# Patient Record
Sex: FEMALE
[De-identification: names, ages, dates, MRNs, and addresses within clinical notes are randomized per-mention and may not be internally consistent; named-entity substitution may affect disease eponyms.]

---

## 2018-11-25 ENCOUNTER — HOSPITAL ENCOUNTER (EMERGENCY)
Dept: HOSPITAL 76 - ED | Age: 38
Discharge: HOME | End: 2018-11-25
Payer: COMMERCIAL

## 2018-11-25 VITALS — DIASTOLIC BLOOD PRESSURE: 108 MMHG | SYSTOLIC BLOOD PRESSURE: 142 MMHG

## 2018-11-25 DIAGNOSIS — R03.0: ICD-10-CM

## 2018-11-25 DIAGNOSIS — L02.215: Primary | ICD-10-CM

## 2018-11-25 PROCEDURE — 56405 I&D VULVA/PERINEAL ABSCESS: CPT

## 2018-11-25 PROCEDURE — 96372 THER/PROPH/DIAG INJ SC/IM: CPT

## 2018-11-25 PROCEDURE — 87070 CULTURE OTHR SPECIMN AEROBIC: CPT

## 2018-11-25 PROCEDURE — 99283 EMERGENCY DEPT VISIT LOW MDM: CPT

## 2018-11-25 PROCEDURE — 87205 SMEAR GRAM STAIN: CPT

## 2018-11-25 NOTE — ED PHYSICIAN DOCUMENTATION
History of Present Illness





- Stated complaint


Stated Complaint: FEMALE 





- Chief complaint


Chief Complaint: General





- History obtained from


History obtained from: Patient





- History of Present Illness


Timing: Other (She has an abscess in the perineum that has become increasingly 

painful over the last couple of days without fevers or chills.  She had one in 

the same spot last year that her  manipulated and popped at home.  She 

denies possibility of pregnancy.)





Review of Systems


Constitutional: denies: Fever, Chills


Cardiac: denies: Chest pain / pressure, Palpitations


Respiratory: denies: Dyspnea, Cough


GI: denies: Abdominal Pain





PD PAST MEDICAL HISTORY





- Present Medications


Home Medications: 


                                Ambulatory Orders











 Medication  Instructions  Recorded  Confirmed


 


Amox/Clav 875/125 [Augmentin] 1 each PO Q12H #14 tablet 11/25/18 


 


Hydrocodone/Acetaminophen 1 - 2 each PO Q6H PRN #14 tablet 11/25/18 





[Hydrocodon-Acetaminophen 5-325]   














- Allergies


Allergies/Adverse Reactions: 


                                    Allergies











Allergy/AdvReac Type Severity Reaction Status Date / Time


 


codeine Allergy  Hives Verified 11/25/18 19:37














PD ED PE NORMAL





- Vitals


Vital signs reviewed: Yes





- General


General: Alert and oriented X 3, Other (She is standing at the side of the bed 

leaning over because she cannot sit on her perineum is otherwise in no 

distress.)





- Abdomen


Abdomen: Soft, Non tender





- Female 


Female : Chaperone present (Nellie DUBON RN), Other (L inner gluteal abscess, 1cm, 

pointed)





- Neuro


Neuro: Alert and oriented X 3, Normal speech





- Psych


Psych: Normal mood, Normal affect





Results





- Vitals


Vitals: 


                               Vital Signs - 24 hr











  11/25/18





  19:34


 


Temperature 36.0 C L


 


Heart Rate 121 H


 


Respiratory 20





Rate 


 


Blood Pressure 144/104 H


 


O2 Saturation 98








                                     Oxygen











O2 Source                      Room air

















Procedures





- Abscess I&D (location)


  ** Buttock


Preparation: Betadine, Lidocaine 1%


Incision: Incised with scalpel, Purulent drainage, Loculations broken, Culture 

obtained.  No: Packed (too small)


Other: Pt tolerated well, Dressing applied, Antibiotic prescribed





Departure





- Departure


Disposition: 01 Home, Self Care


Clinical Impression: 


 Abscess





Condition: Good


Record reviewed to determine appropriate education?: Yes


Instructions:  ED Abscess IandD


Prescriptions: 


Amox/Clav 875/125 [Augmentin] 1 each PO Q12H #14 tablet


Hydrocodone/Acetaminophen [Hydrocodon-Acetaminophen 5-325] 1 - 2 each PO Q6H PRN

#14 tablet


 PRN Reason: pain


Comments: 


We are performing a wound culture, the results should be done in 48-72 hours.  

If antibiotic change is necessary we will call you.  Return if worse in the 

meantime, especially if you develop increased pain, fevers, cannot keep down the

medication.  Otherwise follow-up with your physician in approximately 2-3 days.





Your blood pressure was elevated today on check into the emergency department.  

This does not mean that you have hypertension, it is a common phenomenon to come

to the emergency department and have elevated blood pressure.  I recommend that 

you see your primary care physician within the week to have it rechecked when 

you are feeling better.





Do not drink or drive while taking narcotic pain medication.


Note that many narcotic pain relievers also contain Tylenol/acetaminophen.  

Please ensure that your total dose of acetaminophen from all sources does not 

exceed 3 g (3000 mg) per day.


You may get constipated while on this medication.  Take a stool softener such as

Colace twice a day while you are on it.  Also add an over-the-counter laxative 

such as senna or MiraLAX on any day that you do not have a bowel movement.


If you received a narcotic pain medication or sedative while in the emergency 

department, do not drive for the next 24 hours.

## 2019-09-03 ENCOUNTER — HOSPITAL ENCOUNTER (OUTPATIENT)
Dept: HOSPITAL 76 - LAB | Age: 39
Discharge: HOME | End: 2019-09-03
Attending: OBSTETRICS & GYNECOLOGY
Payer: COMMERCIAL

## 2019-09-03 DIAGNOSIS — O09.529: Primary | ICD-10-CM

## 2019-09-03 PROCEDURE — 81511 FTL CGEN ABNOR FOUR ANAL: CPT

## 2019-09-03 PROCEDURE — 82950 GLUCOSE TEST: CPT

## 2019-09-03 PROCEDURE — 81599 UNLISTED MAAA: CPT

## 2019-09-03 PROCEDURE — 36415 COLL VENOUS BLD VENIPUNCTURE: CPT

## 2019-10-22 ENCOUNTER — HOSPITAL ENCOUNTER (OUTPATIENT)
Dept: HOSPITAL 76 - DI | Age: 39
Discharge: HOME | End: 2019-10-22
Attending: OBSTETRICS & GYNECOLOGY
Payer: COMMERCIAL

## 2019-10-22 DIAGNOSIS — Z34.92: Primary | ICD-10-CM

## 2019-10-22 PROCEDURE — 76811 OB US DETAILED SNGL FETUS: CPT

## 2019-10-22 NOTE — ULTRASOUND REPORT
Reason:  SUPERVISION OF NORMAL PREGNANCY

Procedure Date:  10/22/2019   

Accession Number:  [id number] / [id number]                    

Procedure:  US  - OB Detailed Fetal Eval CPT Code:  

 

FULL RESULT:

 

 

EXAM:

COMPLETE OBSTETRICAL ULTRASOUND

 

EXAM DATE: 10/22/2019 11:56 AM.

 

CLINICAL HISTORY: Fetal anatomic survey.

 

COMPARISON: None.

 

TECHNIQUE: Real-time sonographic evaluation of the fetus performed by the 

sonographer. Multiple representative static images were saved for review. 

Additional transvaginal imaging to more accurately evaluate cervical 

length/placental position/etc.

 

DATING:

Established EGA 23 weeks 1 day with YIN 02/17/2020 based on provider 

information.

 

EGA 23 weeks 4 days with YIN 02/14/2020 based on the current ultrasound.

 

GENERAL EVALUATION

Morse pregnancy.

Cardiac activity: 141 bpm.

Fetal movement: Present

Presentation: Variable

Placenta: Posterior position. No evidence for previa.

Umbilical cord: 3 vessel cord. Central placental cord origin.

Amniotic fluid: Subjectively normal. MVP 6.2 cm.

 

FETAL BIOMETRY

Bi-Parietal Diameter (BPD): 5.6 cm, 23 weeks 0 days

Head Circumference (HC):   21.4 cm, 23 weeks 3 days

Abdominal Circumference (AC): 19.9 cm, 24 weeks 4 days

Femur Length (FL): 4.1 cm, 23 weeks 1 day

 

Estimated Fetal Weight: 635 g, 76th percentile for 23 weeks 1 day.

 

FETAL ANATOMY

The intracranial structures, profile, face/nose/lips, spine, stomach,  

diaphragm, kidneys, bladder, and extremities were imaged and demonstrate 

no abnormality. The anterior fetal abdominal wall with cord insertion and 

fetal cardiac anatomy are not well seen.

 

MATERNAL STRUCTURES

Uterus: Unremarkable.

Cervix: Long and closed. Transabdominal length 6 cm.

Right ovary/adnexa: Unremarkable.

Left ovary/adnexa: Unremarkable.

Free fluid: None.

IMPRESSION:

1. Morse live intrauterine pregnancy with gestational age 23 weeks 1 

day based on provider information.

2. Estimated fetal weight is within expected limits for assigned dating.

3. Fetal cardiac anatomy and fetal anterior abdominal wall with cord 

insertion not well seen today. Suggest follow-up imaging in 2-3 weeks. 

Otherwise Normal fetal anatomic survey.

 

RADIA

## 2019-11-05 ENCOUNTER — HOSPITAL ENCOUNTER (OUTPATIENT)
Dept: HOSPITAL 76 - DI | Age: 39
Discharge: HOME | End: 2019-11-05
Attending: OBSTETRICS & GYNECOLOGY
Payer: COMMERCIAL

## 2019-11-05 DIAGNOSIS — Z34.90: Primary | ICD-10-CM

## 2019-11-05 PROCEDURE — 76816 OB US FOLLOW-UP PER FETUS: CPT

## 2019-12-06 ENCOUNTER — HOSPITAL ENCOUNTER (OUTPATIENT)
Dept: HOSPITAL 76 - DI | Age: 39
Discharge: HOME | End: 2019-12-06
Attending: OBSTETRICS & GYNECOLOGY
Payer: COMMERCIAL

## 2019-12-06 DIAGNOSIS — Z3A.29: ICD-10-CM

## 2019-12-06 DIAGNOSIS — O09.893: ICD-10-CM

## 2019-12-06 DIAGNOSIS — O24.419: Primary | ICD-10-CM

## 2019-12-06 DIAGNOSIS — O40.3XX0: ICD-10-CM

## 2019-12-06 PROCEDURE — 76816 OB US FOLLOW-UP PER FETUS: CPT

## 2019-12-09 NOTE — ULTRASOUND REPORT
Reason:  GESTATIONAL DIABETES, POLYHYDRAMNIOS

Procedure Date:  12/06/2019   

Accession Number:  [id number] / [id number]                    

Procedure:  US  - OB F/U or Repeat CPT Code:  

 

***Final Report***

 

 

FULL RESULT:

 

 

EXAM:

FOLLOW-UP OBSTETRICAL ULTRASOUND

 

EXAM DATE: 12/6/2019 04:24 PM.

 

CLINICAL HISTORY: Gestational diabetes, polyhydramnios.

 

COMPARISON: OB F/U OR REPEAT 11/05/2019 12:25 PM.

OB DETAILED FETAL EVAL 10/22/2019 10:09 AM.

 

TECHNIQUE: Real-time sonographic evaluation of the fetus performed by the 

sonographer.  Multiple representative static images were saved for review.

 

DATING:

Established EGA 29 weeks 4 days with YIN 02/17/2020 based on working due 

date and LMP.

 

EGA 31 weeks 0 days with YIN 02/07/2020 based on the current ultrasound.

 

GENERAL EVALUATION

Morse pregnancy.

Cardiac activity: 129 bpm.

Fetal movement: Visualized.

Presentation: Cephalic.

Placenta: Posterior position.

Amniotic fluid: Normal. JOHNNY 14.2 cm. MVP 4.1 cm.

 

FETAL BIOMETRY

Bi-Parietal Diameter (BPD): 7.7 cm, 30 weeks 5 days.

Head Circumference (HC): 28.6 cm, 31 weeks 3 days.

Abdominal Circumference (AC): 26.6 cm, 30 weeks 5 days.

Femur Length (FL): 5.9 cm, 31 weeks 0 days.

 

Estimated Fetal Weight: 1659 g, 83rd percentile for 29 weeks 4 days.

IMPRESSION:

1. Morse live intrauterine pregnancy with gestational age 29 weeks 4 

days based on LMP.

2. Estimated fetal weight is within expected limits for assigned dating.

3. Normal interval growth compared to date of prior biometry.

4. Amniotic fluid within normal limits.

 

RADIA

## 2020-01-07 ENCOUNTER — HOSPITAL ENCOUNTER (OUTPATIENT)
Dept: HOSPITAL 76 - DI | Age: 40
Discharge: HOME | End: 2020-01-07
Attending: OBSTETRICS & GYNECOLOGY
Payer: COMMERCIAL

## 2020-01-07 DIAGNOSIS — Z3A.36: ICD-10-CM

## 2020-01-07 DIAGNOSIS — O09.893: Primary | ICD-10-CM

## 2020-01-07 DIAGNOSIS — O24.419: ICD-10-CM

## 2020-01-07 PROCEDURE — 76816 OB US FOLLOW-UP PER FETUS: CPT

## 2020-01-08 NOTE — ULTRASOUND REPORT
Reason:  GESTATIONAL DIABETES, SUPERVOF HIGH RISK PREG

Procedure Date:  01/07/2020   

Accession Number:  [id number] / [id number]                    

Procedure:  US  - OB F/U or Repeat CPT Code:  

 

***Final Report***

 

 

FULL RESULT:

 

 

EXAM:

FOLLOW-UP OBSTETRICAL ULTRASOUND.

 

EXAM DATE: 01/07/2020 02:22 PM.

 

CLINICAL HISTORY: Gestational diabetes. High-risk pregnancy. Assess fetal 

growth and amniotic fluid.

 

COMPARISON: 12/06/2019.

 

TECHNIQUE: Real-time sonographic evaluation of the fetus performed by the 

sonographer. Additional transvaginal imaging to more accurately evaluate 

cervical length/placental position/etc. Multiple representative static 

images were saved for review.

 

DATING:

Established EGA 34 weeks/1 day with YIN 02/17/2020 based on provider 

stated.

EGA 36 weeks/5 days with YIN 01/30/2020 based on current ultrasound.

EGA 36 weeks/5 days with YIN 01/30/2020 based on the initial ultrasound 

10/22/2019.

 

GENERAL EVALUATION

Morse pregnancy.

Cardiac activity: 139 bpm.

Fetal movement: Visualized.

Presentation: Cephalic.

Placenta: Posterior position.

Amniotic fluid: Normal. JOHNNY 19.5 cm. MVP 6.9 cm.

 

FETAL BIOMETRY

Bi-Parietal Diameter (BPD): 9.1 cm, 36 weeks/6 days

Head Circumference (HC): 33.49 cm, 38 weeks/2 days

Abdominal Circumference (AC): 33.0 cm, 37 weeks/0 days

Femur Length (FL): 6.70 cm, 34 weeks/3 days

 

Estimated Fetal Weight: 2945 g, 96 percentile for assigned dating.

IMPRESSION:

1. Morse live intrauterine pregnancy with gestational age 36 weeks 5 

days based on fetal biometry performed today. This is within 2 weeks of 

the gestational age based on the initial ultrasound (10/22/2019).

2. Estimated fetal weight (2945 g) is within the 96th percentile for 

assigned dating.

3. Normal amniotic fluid (19.5 cm - JOHNNY).

 

JONASA

## 2020-01-21 ENCOUNTER — HOSPITAL ENCOUNTER (OUTPATIENT)
Dept: HOSPITAL 76 - LAB.R | Age: 40
Discharge: HOME | End: 2020-01-21
Attending: OBSTETRICS & GYNECOLOGY
Payer: COMMERCIAL

## 2020-01-21 DIAGNOSIS — Z36.85: Primary | ICD-10-CM

## 2020-01-21 LAB — T VAGINALIS RRNA GENITAL QL PROBE: NEGATIVE

## 2020-01-21 PROCEDURE — 87661 TRICHOMONAS VAGINALIS AMPLIF: CPT

## 2020-01-21 PROCEDURE — 87491 CHLMYD TRACH DNA AMP PROBE: CPT

## 2020-01-21 PROCEDURE — 87591 N.GONORRHOEAE DNA AMP PROB: CPT

## 2020-01-21 PROCEDURE — 87797 DETECT AGENT NOS DNA DIR: CPT

## 2020-02-07 ENCOUNTER — HOSPITAL ENCOUNTER (OUTPATIENT)
Dept: HOSPITAL 76 - DI | Age: 40
Discharge: HOME | End: 2020-02-07
Attending: OBSTETRICS & GYNECOLOGY
Payer: COMMERCIAL

## 2020-02-07 DIAGNOSIS — O09.892: ICD-10-CM

## 2020-02-07 DIAGNOSIS — Z3A.38: ICD-10-CM

## 2020-02-07 DIAGNOSIS — O40.3XX0: ICD-10-CM

## 2020-02-07 DIAGNOSIS — O24.419: Primary | ICD-10-CM

## 2020-02-07 PROCEDURE — 76816 OB US FOLLOW-UP PER FETUS: CPT

## 2020-02-08 NOTE — ULTRASOUND REPORT
Reason:  GESTATION DIABETES, POLYHYDRAMNIOS, SUPERV 2ND TRI

Procedure Date:  02/07/2020   

Accession Number:  [id number] / [id number]                    

Procedure:  US  - OB F/U or Repeat CPT Code:  

 

***Final Report***

 

 

FULL RESULT:

 

 

EXAM:

FOLLOW-UP OBSTETRICAL ULTRASOUND

 

EXAM DATE: 2/7/2020 09:24 AM.

 

CLINICAL HISTORY: Gestational diabetes. Polyhydramnios.

 

COMPARISON: 01/07/2020 radiograph.

 

TECHNIQUE: Real-time sonographic evaluation of the fetus performed by the 

sonographer. Additional transvaginal imaging to more accurately evaluate 

cervical length/placental position/etc. Multiple representative static 

images were saved for review.

 

DATING:

Established EGA 38 weeks 4 days with YIN 2/17/20 based on established 

gestational age.

EGA 39 weeks 0 days with YIN 2/14/20 based on first ultrasound.

 

 

GENERAL EVALUATION

Morse pregnancy.

Cardiac activity: 142 bpm.

Fetal movement: Visualized.

Presentation: Cephalic.

Placenta: Posterior/fundal position.

Amniotic fluid: Normal. JOHNNY 16.2 cm. MVP 6.1 cm.

 

FETAL BIOMETRY

Bi-Parietal Diameter (BPD): 9.89 cm, 40 weeks 4 days +/- 3 weeks

Head Circumference (HC): 35.2 cm, 40 weeks 0 days +/- 3 weeks

Abdominal Circumference (AC): 36.7 cm, 40 weeks 4 days +/-3 weeks

Femur Length (FL): 7.6 cm, 38 weeks 5 days +/-3 weeks

 

Estimated Fetal Weight: 4047 g, 94.9 percentile for 38 weeks 4 days.

IMPRESSION:

1. Morse live intrauterine pregnancy with gestational age 38 weeks 4 

days based on established gestational age.

2. Fetal weight remains near the 95th percentile.

3. Normal interval growth compared to date of prior biometry.

 

RADIA

## 2020-02-10 ENCOUNTER — HOSPITAL ENCOUNTER (OUTPATIENT)
Dept: HOSPITAL 76 - LAB | Age: 40
Discharge: HOME | End: 2020-02-10
Attending: OBSTETRICS & GYNECOLOGY
Payer: COMMERCIAL

## 2020-02-10 DIAGNOSIS — O34.211: ICD-10-CM

## 2020-02-10 DIAGNOSIS — Z3A.00: ICD-10-CM

## 2020-02-10 DIAGNOSIS — Z01.812: Primary | ICD-10-CM

## 2020-02-10 PROCEDURE — 86850 RBC ANTIBODY SCREEN: CPT

## 2020-02-10 PROCEDURE — 86900 BLOOD TYPING SEROLOGIC ABO: CPT

## 2020-02-10 PROCEDURE — 36415 COLL VENOUS BLD VENIPUNCTURE: CPT

## 2020-02-10 PROCEDURE — 86901 BLOOD TYPING SEROLOGIC RH(D): CPT

## 2020-02-12 ENCOUNTER — HOSPITAL ENCOUNTER (INPATIENT)
Dept: HOSPITAL 76 - FBP | Age: 40
LOS: 2 days | Discharge: HOME | End: 2020-02-14
Attending: OBSTETRICS & GYNECOLOGY | Admitting: OBSTETRICS & GYNECOLOGY
Payer: COMMERCIAL

## 2020-02-12 DIAGNOSIS — O34.211: Primary | ICD-10-CM

## 2020-02-12 DIAGNOSIS — N85.8: ICD-10-CM

## 2020-02-12 DIAGNOSIS — Z79.899: ICD-10-CM

## 2020-02-12 DIAGNOSIS — Z87.891: ICD-10-CM

## 2020-02-12 DIAGNOSIS — Z3A.39: ICD-10-CM

## 2020-02-12 LAB
BASOPHILS NFR BLD AUTO: 0 10^3/UL (ref 0–0.1)
BASOPHILS NFR BLD AUTO: 0.3 %
EOSINOPHIL # BLD AUTO: 0 10^3/UL (ref 0–0.7)
EOSINOPHIL NFR BLD AUTO: 0.3 %
ERYTHROCYTE [DISTWIDTH] IN BLOOD BY AUTOMATED COUNT: 14.7 % (ref 12–15)
HGB UR QL STRIP: 12 G/DL (ref 12–16)
LYMPHOCYTES # SPEC AUTO: 2.8 10^3/UL (ref 1.5–3.5)
LYMPHOCYTES NFR BLD AUTO: 27.4 %
MCH RBC QN AUTO: 29.8 PG (ref 27–31)
MCHC RBC AUTO-ENTMCNC: 33.5 G/DL (ref 32–36)
MCV RBC AUTO: 88.8 FL (ref 81–99)
MONOCYTES # BLD AUTO: 0.7 10^3/UL (ref 0–1)
MONOCYTES NFR BLD AUTO: 6.9 %
NEUTROPHILS # BLD AUTO: 6.7 10^3/UL (ref 1.5–6.6)
NEUTROPHILS # SNV AUTO: 10.3 X10^3/UL (ref 4.8–10.8)
NEUTROPHILS NFR BLD AUTO: 64.7 %
PDW BLD AUTO: 10.6 FL (ref 7.9–10.8)
PLATELET # BLD: 190 10^3/UL (ref 130–450)
RBC MAR: 4.03 10^6/UL (ref 4.2–5.4)

## 2020-02-12 PROCEDURE — 36415 COLL VENOUS BLD VENIPUNCTURE: CPT

## 2020-02-12 PROCEDURE — 85025 COMPLETE CBC W/AUTO DIFF WBC: CPT

## 2020-02-12 RX ADMIN — OXYCODONE PRN MG: 5 TABLET ORAL at 15:13

## 2020-02-12 RX ADMIN — OXYCODONE PRN MG: 5 TABLET ORAL at 20:03

## 2020-02-12 RX ADMIN — SIMETHICONE PRN MG: 80 TABLET, CHEWABLE ORAL at 17:57

## 2020-02-12 RX ADMIN — SODIUM CHLORIDE, PRESERVATIVE FREE SCH ML: 5 INJECTION INTRAVENOUS at 17:17

## 2020-02-12 RX ADMIN — ACETAMINOPHEN SCH MG: 500 TABLET ORAL at 13:03

## 2020-02-12 RX ADMIN — ACETAMINOPHEN SCH MG: 500 TABLET ORAL at 21:02

## 2020-02-12 RX ADMIN — DOCUSATE SODIUM SCH MG: 100 CAPSULE, LIQUID FILLED ORAL at 21:02

## 2020-02-12 RX ADMIN — HEPARIN SODIUM SCH UNIT: 5000 INJECTION, SOLUTION INTRAVENOUS; SUBCUTANEOUS at 21:03

## 2020-02-12 RX ADMIN — IBUPROFEN SCH MG: 600 TABLET, FILM COATED ORAL at 16:35

## 2020-02-12 RX ADMIN — IBUPROFEN SCH MG: 600 TABLET, FILM COATED ORAL at 22:38

## 2020-02-12 NOTE — ANESTHESIA
Pre-Anesthesia VS, & Labs





- Diagnosis





IUP





- Procedure





Repeat C Sec


Vital Signs: 





                                        











Temp Pulse Resp BP Pulse Ox


 


 36.8 C   97   18   110/64   97 


 


 20 05:30  20 05:30  20 05:30  20 05:30  20 05:30














                                        





Height                           5 ft 4 in


Weight (kg)                      128.73 kg


Body Mass Index                  49.1











- NPO


>8 hours





- Pregnancy


Is Patient Pregnant?: Yes





- Lab Results


Current Lab Results: 





Laboratory Tests





20 06:22: POC Whole Bld Glucose 86


20 06:20: WBC 10.3, RBC 4.03 L, Hgb 12.0, Hct 35.8 L, MCV 88.8, MCH 29.8, 

MCHC 33.5, RDW 14.7, Plt Count 190, MPV 10.6, Neut # (Auto) 6.7 H, Lymph # 

(Auto) 2.8, Mono # (Auto) 0.7, Eos # (Auto) 0.0, Baso # (Auto) 0.0, Absolute 

Nucleated RBC 0.00, Nucleated RBC % 0.0








Fish Bones: 


                                 20 06:20








Home Medications and Allergies





Active Medications





Lactated Ringer's (Lr)  1,000 mls @ 0 mls/hr IV .Q0M ROMINA


   Last Admin: 20 06:31 Dose:  30 mls/hr








Allergies/Adverse Reactions: 


                                    Allergies











Allergy/AdvReac Type Severity Reaction Status Date / Time


 


codeine Allergy  Hives Verified 18 19:37














Anes History & Medical History





- Anesthetic History


Anesthesia Complications: reports: No previous complications


Family history of Anesthesia Complications: Denies


Family history of Malignant Hyperthermia: Denies





- Medical History


Cardiovascular: reports: None


Pulmonary: reports: None


Gastrointestinal: reports: GERD (reports really bad heart burn. We will give 

bicitra)


Urinary: reports: None


Neuro: reports: None


Musculoskeletal: reports: None


Endocrine/Autoimmune: reports: None


Blood Disorders: reports: None


Skin: reports: None


Smoking Status: Former smoker


Psychosocial: reports: No issues indicated





- Surgical History


Eyes Ears Nose Throat (EENT): Tonsil/Adenoidectomy


Gynecologic:  section





Exam


General: Alert, Oriented x3, Cooperative, No acute distress


Dental: WNL


Mouth Opening: 3 Fingerbreadth


Neck Mobility: Normal


Mallampati classification: II


Thyromental Distance: less than 4 cm


Respiratory: Lungs clear, Normal breath sounds, No respiratory distress, No 

accessory muscle use


Cardiovascular: Regular rate, Normal S1, Normal S2, No murmurs


Abdomen: Normal bowel sounds, Soft, No tenderness, No hepatospenomegaly, No 

masses


Extremities: No clubbing, No cyanosis, No edema, Normal pulses, No 

tenderness/swelling


Neurological: Normal gait, Normal speech, Strength at 5/5 X4 ext, Normal tone, 

Sensation intact, Cranial nerves 3-12 NL, Reflexes 2+


Mental/Cognitive Status: Alert/Oriented X3, Normal for patient


Cognitive Status: Within normal limits





Plan


Anesthesia Type: General, Spinal


Consent for Procedure(s) Verified and Reviewed: Yes


Code Status: Attempt Resuscitation


ASA classification: 2-Mild systemic disease


Is this case an emergency?: No

## 2020-02-12 NOTE — OPERATIVE REPORT
Operative Report





- General


Admit Date: 20


Procedure Date: 20


Pre-Op Diagnosis: Prior , IUP at 39w


Procedure Performed: 





Repeat 





- Procedure Note


Primary Surgeon: patricia


Secondary Surgeon: Sharyn = ARELI Sen


Anesthesia Technique: Epidural


Pathology: 





cord blood for typing


IV Fluids (mL): 800


Estimated Blood Loss (mL): 400


Urine Output (mL): 200


Findings: 





Liveborn male, /


Clear amniotic fluid


Ov/tubes normal to palpation.  No adhesive disease.  


Complications: 





none

## 2020-02-12 NOTE — OPERATIVE REPORT
DATE OF SERVICE: 2020

Physician: Danna Bennett MD

 

PREOPERATIVE DIAGNOSES

1.  Intrauterine pregnancy at 39 weeks.

2.  Prior  section.

3.  Gestational diabetes.

 

POSTOPERATIVE DIAGNOSIS:  Status post repeat  section.

 

 

PROCEDURE PERFORMED:  Repeat low transverse  section.

 

SURGEON:  Danna Bennett MD

 

ASSISTANT:  Amber Sen CNM.

 

ANESTHESIA:  Spinal.

 

ESTIMATED BLOOD LOSS:  400 mL

 

INTRAVENOUS FLUIDS:  800 mL of crystalloid.

 

URINE OUTPUT:  200 mL, clear.

 

COUNTS:  Correct x2.

 

COMPLICATIONS:  None apparent.

 

DISPOSITION:  Stable to recovery room.

 

PROPHYLAXIS:  Ancef 3 grams weight based.  SCDs to bilateral lower extremities.

 

SPECIMENS:  Cord blood to pathology.

 

FINDINGS:  Liveborn male, weight is pending.  Apgars 9 at one minute and 9 at five minutes.  Amniotic
 fluid was clear.  The ovaries and fallopian tubes were normal to palpation.  No adhesive disease was
 encountered.  

 

COUNSELING:  Patient declined her option of trial of labor.

 

DESCRIPTION OF PROCEDURE:  Patient was brought to the operating room, where she underwent spinal anes
thesia.  She was placed in a left tilt.  A Umana catheter was placed.  She was prepped and draped in 
the usual sterile fashion.  A scalpel was used to make a Pfannenstiel skin incision in the area of th
e patient's prior scar.  This was carried down to the fascia, which was nicked in the midline bilater
ally.  The fascial incision was extended laterally, sharply.  The Kocher clamps were then placed on t
he inferior margin of the fascial incision and the fascia was sharply and bluntly dissected off of th
e underlying rectus.  The same was performed superiorly.  The peritoneum was sharply entered.  Room w
as deemed to be adequate.  A bladder retractor was placed.  A scalpel was used to make a transverse i
ncision in the lower uterine segment.  The uterine cavity was breeched with a finger.  The uterine in
cision was extended laterally by applying pressure caudally and cranially.  The surgeon's hand was pl
aced into the uterine cavity and the fetal head was elevated and then delivered with the assistance o
f fundal pressure.  The shoulders and body easily followed.  The cord was left pulsating for 30 secon
ds.  During this time, the baby was warmed, dried and stimulated.  The umbilical cord was clamped x2 
and cut and the baby was handed to the team in waiting.  

 

Cord blood was obtained for typing.  The placenta was then removed with external uterine massage and 
appeared intact.  The uterus was curetted with a dry sponge without any return of membranes.  The [place]
rine incision was closed with a running layer of 0 Vicryl.  A second imbricating layer was performed.
  There was a bit of oozing in the midline, which was reapproximated with a single interrupted suture
 of 0 Vicryl with good hemostasis.  Excellent hemostasis was noted on the uterine incision, rectus an
d fascia.  The fascia was closed with a running layer of 0 Vicryl from end to end.  The subcutaneous 
tissues were irrigated and then reapproximated with interrupted sutures of 2-0 Vicryl.  The skin was 
closed with a subcuticular suture of 4-0 Monocryl.  Dermabond was then applied.  Fundal massage yield
ed a normal amount of blood and clot.

 

 

DD: 2020 12:38

TD: 2020 12:41

Job #: [id number]

## 2020-02-13 LAB
BASOPHILS NFR BLD AUTO: 0 10^3/UL (ref 0–0.1)
BASOPHILS NFR BLD AUTO: 0.2 %
EOSINOPHIL # BLD AUTO: 0 10^3/UL (ref 0–0.7)
EOSINOPHIL NFR BLD AUTO: 0.1 %
ERYTHROCYTE [DISTWIDTH] IN BLOOD BY AUTOMATED COUNT: 14.7 % (ref 12–15)
HGB UR QL STRIP: 10.3 G/DL (ref 12–16)
LYMPHOCYTES # SPEC AUTO: 2.6 10^3/UL (ref 1.5–3.5)
LYMPHOCYTES NFR BLD AUTO: 22.3 %
MCH RBC QN AUTO: 29.6 PG (ref 27–31)
MCHC RBC AUTO-ENTMCNC: 32.4 G/DL (ref 32–36)
MCV RBC AUTO: 91.4 FL (ref 81–99)
MONOCYTES # BLD AUTO: 0.8 10^3/UL (ref 0–1)
MONOCYTES NFR BLD AUTO: 7.1 %
NEUTROPHILS # BLD AUTO: 8.2 10^3/UL (ref 1.5–6.6)
NEUTROPHILS # SNV AUTO: 11.8 X10^3/UL (ref 4.8–10.8)
NEUTROPHILS NFR BLD AUTO: 69.7 %
PDW BLD AUTO: 10.3 FL (ref 7.9–10.8)
PLATELET # BLD: 171 10^3/UL (ref 130–450)
RBC MAR: 3.48 10^6/UL (ref 4.2–5.4)

## 2020-02-13 RX ADMIN — OXYCODONE PRN MG: 5 TABLET ORAL at 03:03

## 2020-02-13 RX ADMIN — ACETAMINOPHEN SCH MG: 500 TABLET ORAL at 05:07

## 2020-02-13 RX ADMIN — DOCUSATE SODIUM SCH MG: 100 CAPSULE, LIQUID FILLED ORAL at 21:19

## 2020-02-13 RX ADMIN — SODIUM CHLORIDE, PRESERVATIVE FREE SCH: 5 INJECTION INTRAVENOUS at 07:40

## 2020-02-13 RX ADMIN — SIMETHICONE PRN MG: 80 TABLET, CHEWABLE ORAL at 09:57

## 2020-02-13 RX ADMIN — SIMETHICONE PRN MG: 80 TABLET, CHEWABLE ORAL at 18:03

## 2020-02-13 RX ADMIN — SODIUM CHLORIDE, PRESERVATIVE FREE SCH: 5 INJECTION INTRAVENOUS at 14:21

## 2020-02-13 RX ADMIN — OXYCODONE PRN MG: 5 TABLET ORAL at 08:00

## 2020-02-13 RX ADMIN — IBUPROFEN SCH MG: 600 TABLET, FILM COATED ORAL at 10:46

## 2020-02-13 RX ADMIN — ACETAMINOPHEN SCH MG: 500 TABLET ORAL at 21:19

## 2020-02-13 RX ADMIN — OXYCODONE PRN MG: 5 TABLET ORAL at 21:20

## 2020-02-13 RX ADMIN — DOCUSATE SODIUM SCH MG: 100 CAPSULE, LIQUID FILLED ORAL at 09:57

## 2020-02-13 RX ADMIN — OXYCODONE PRN MG: 5 TABLET ORAL at 11:52

## 2020-02-13 RX ADMIN — ACETAMINOPHEN SCH MG: 500 TABLET ORAL at 12:58

## 2020-02-13 RX ADMIN — HEPARIN SODIUM SCH UNIT: 5000 INJECTION, SOLUTION INTRAVENOUS; SUBCUTANEOUS at 21:20

## 2020-02-13 RX ADMIN — OXYCODONE PRN MG: 5 TABLET ORAL at 16:29

## 2020-02-13 RX ADMIN — IBUPROFEN SCH MG: 600 TABLET, FILM COATED ORAL at 04:26

## 2020-02-13 RX ADMIN — SIMETHICONE PRN MG: 80 TABLET, CHEWABLE ORAL at 12:59

## 2020-02-13 RX ADMIN — IBUPROFEN SCH MG: 600 TABLET, FILM COATED ORAL at 16:31

## 2020-02-13 RX ADMIN — IBUPROFEN SCH MG: 600 TABLET, FILM COATED ORAL at 22:39

## 2020-02-13 RX ADMIN — HEPARIN SODIUM SCH UNIT: 5000 INJECTION, SOLUTION INTRAVENOUS; SUBCUTANEOUS at 09:57

## 2020-02-13 NOTE — PROVIDER PROGRESS NOTE
Subjective





- Subjective


Subjective: 





Pt seen at 0845


Feeling well, pain is normal, no problems.  Eat, ambulate, urinate, breastfeed 

well.  Having flatus.  No heavy bleeding.  No mood issues





AVSS


Alert, smiling, NAD


Abd soft, nt/nd.  Fundus firm at umbilicus.  Incision c/d/i without erythema or 

induration


LE without edema





P2 POD #1 s/p scheduled repeat , doing well.  Plan for routine PP care.

 Obese, heparin 5,000 sq bid.  





Objective





- Vital Signs/Intake & Output


Vital Signs: 


                                Vital Signs x48h











  Temp Pulse Resp BP Pulse Ox


 


 20 16:34  97.9 F  86  18  115/55 L  98











Intake & Output: 


                                 Intake & Output











 02/10/20 02/11/20 02/12/20 02/13/20





 23:59 23:59 23:59 23:59


 


Intake Total   1600 500


 


Output Total   1110 2875


 


Balance   490 -2375














- Lab Results


Fish Bones: 


                                 20 07:00





Other Labs: 


                               Lab Results x24hrs











  20 Range/Units





  07:00 


 


WBC  11.8 H  (4.8-10.8)  x10^3/uL


 


RBC  3.48 L  (4.20-5.40)  10^6/uL


 


Hgb  10.3 L  (12.0-16.0)  g/dL


 


Hct  31.8 L  (37.0-47.0)  %


 


MCV  91.4  (81.0-99.0)  fL


 


MCH  29.6  (27.0-31.0)  pg


 


MCHC  32.4  (32.0-36.0)  g/dL


 


RDW  14.7  (12.0-15.0)  %


 


Plt Count  171  (130-450)  10^3/uL


 


MPV  10.3  (7.9-10.8)  fL


 


Neut # (Auto)  8.2 H  (1.5-6.6)  10^3/uL


 


Lymph # (Auto)  2.6  (1.5-3.5)  10^3/uL


 


Mono # (Auto)  0.8  (0.0-1.0)  10^3/uL


 


Eos # (Auto)  0.0  (0.0-0.7)  10^3/uL


 


Baso # (Auto)  0.0  (0.0-0.1)  10^3/uL


 


Absolute Nucleated RBC  0.00  x10^3/uL


 


Nucleated RBC %  0.0  /100WBC

## 2020-02-14 VITALS — SYSTOLIC BLOOD PRESSURE: 115 MMHG | DIASTOLIC BLOOD PRESSURE: 64 MMHG

## 2020-02-14 RX ADMIN — ACETAMINOPHEN SCH MG: 500 TABLET ORAL at 04:30

## 2020-02-14 RX ADMIN — OXYCODONE PRN MG: 5 TABLET ORAL at 04:30

## 2020-02-14 RX ADMIN — DOCUSATE SODIUM SCH MG: 100 CAPSULE, LIQUID FILLED ORAL at 10:29

## 2020-02-14 RX ADMIN — IBUPROFEN SCH MG: 600 TABLET, FILM COATED ORAL at 10:29

## 2020-02-14 RX ADMIN — IBUPROFEN SCH MG: 600 TABLET, FILM COATED ORAL at 04:30

## 2020-02-14 NOTE — DISCHARGE PLAN
Discharge Plan


Problem Reviewed?: Yes


Disposition: 01 Home, Self Care


Condition: Good


Diet: Regular


Activity Restrictions: No driving on narcotics, no sex for 6w


Shower Restrictions: No


Driving Restrictions: Yes


No Smoking: If you smoke, Please STOP!  Call [phone] for help.


Follow-up with: 


Julio Odell MD [Provider Admit Priv/Credential] - 1 Week

## 2020-02-15 NOTE — DISCHARGE SUMMARY
Physician: Danna Bennett MD

DATE OF ADMISSION: 2020

DATE OF DISCHARGE:  2020

 

ADMITTING DIAGNOSES: Prior  section, desires repeat  section.

 

DISCHARGE DIAGNOSIS:  Status post repeat  section, uncomplicated.

 

OPERATIONS: 2020 repeat  section, uncomplicated, of a liveborn male.

 

HOSPITAL COURSE:  The patient was admitted for a scheduled repeat  section, which was uncompl
icated.  Her postoperative course was also uncomplicated.  By postpartum day 2, she was requesting ritesh frankel home.  She was eating, ambulating, urinating and breastfeeding without difficulties.  Her cammie
n was well controlled and she did not have heavy bleeding.  She is afebrile with normal vital signs. 
 She is alert and smiling, in no apparent distress.  Abdomen:  Soft, nontender, nondistended.  Fundus
 firm and at the umbilicus.  The incision was clean, dry and intact without erythema or induration.  
There was no lower extremity edema bilaterally.

 

DISCHARGE DISPOSITION:  Home.

 

CONDITION:  Good.

 

FOLLOWUP:  Follow up in 1 week at [facility] for an incision check.

 

DISCHARGE MEDICATIONS:  

1.  Prenatal vitamins.

2.  Ibuprofen p.r.n. pain.

3.  Oxycodone p.r.n. severe pain, #20, no refills.

4.  Colace p.r.n. to soften stool.

 

 

DD: 02/15/2020 16:45

TD: 02/15/2020 16:48

Job #: [id number]

## 2020-02-17 ENCOUNTER — HOSPITAL ENCOUNTER (EMERGENCY)
Dept: HOSPITAL 76 - ED | Age: 40
LOS: 1 days | Discharge: HOME | End: 2020-02-18
Payer: COMMERCIAL

## 2020-02-17 DIAGNOSIS — L76.22: Primary | ICD-10-CM

## 2020-02-17 PROCEDURE — 99283 EMERGENCY DEPT VISIT LOW MDM: CPT

## 2020-02-18 VITALS — SYSTOLIC BLOOD PRESSURE: 131 MMHG | DIASTOLIC BLOOD PRESSURE: 73 MMHG

## 2020-02-23 ENCOUNTER — HOSPITAL ENCOUNTER (OUTPATIENT)
Dept: HOSPITAL 76 - ED | Age: 40
Setting detail: OBSERVATION
LOS: 2 days | Discharge: HOME | End: 2020-02-25
Attending: OBSTETRICS & GYNECOLOGY | Admitting: OBSTETRICS & GYNECOLOGY
Payer: COMMERCIAL

## 2020-02-23 DIAGNOSIS — B96.89: ICD-10-CM

## 2020-02-23 DIAGNOSIS — R23.8: ICD-10-CM

## 2020-02-23 LAB
ALBUMIN DIAFP-MCNC: 3.5 G/DL (ref 3.2–5.5)
ALBUMIN/GLOB SERPL: 1.1 {RATIO} (ref 1–2.2)
ALP SERPL-CCNC: 80 IU/L (ref 42–121)
ALT SERPL W P-5'-P-CCNC: 25 IU/L (ref 10–60)
ANION GAP SERPL CALCULATED.4IONS-SCNC: 10 MMOL/L (ref 6–13)
AST SERPL W P-5'-P-CCNC: 21 IU/L (ref 10–42)
BASOPHILS NFR BLD AUTO: 0.1 10^3/UL (ref 0–0.1)
BASOPHILS NFR BLD AUTO: 0.6 %
BILIRUB BLD-MCNC: 0.6 MG/DL (ref 0.2–1)
BUN SERPL-MCNC: 16 MG/DL (ref 6–20)
CALCIUM UR-MCNC: 9.2 MG/DL (ref 8.5–10.3)
CHLORIDE SERPL-SCNC: 106 MMOL/L (ref 101–111)
CO2 SERPL-SCNC: 24 MMOL/L (ref 21–32)
CREAT SERPLBLD-SCNC: 0.5 MG/DL (ref 0.4–1)
EOSINOPHIL # BLD AUTO: 0.1 10^3/UL (ref 0–0.7)
EOSINOPHIL NFR BLD AUTO: 0.7 %
ERYTHROCYTE [DISTWIDTH] IN BLOOD BY AUTOMATED COUNT: 13.9 % (ref 12–15)
GLOBULIN SER-MCNC: 3.1 G/DL (ref 2.1–4.2)
GLUCOSE SERPL-MCNC: 86 MG/DL (ref 70–100)
HGB UR QL STRIP: 12.7 G/DL (ref 12–16)
LIPASE SERPL-CCNC: 27 U/L (ref 22–51)
LYMPHOCYTES # SPEC AUTO: 3.1 10^3/UL (ref 1.5–3.5)
LYMPHOCYTES NFR BLD AUTO: 31.6 %
MCH RBC QN AUTO: 29.5 PG (ref 27–31)
MCHC RBC AUTO-ENTMCNC: 32.8 G/DL (ref 32–36)
MCV RBC AUTO: 90 FL (ref 81–99)
MONOCYTES # BLD AUTO: 0.6 10^3/UL (ref 0–1)
MONOCYTES NFR BLD AUTO: 6.1 %
NEUTROPHILS # BLD AUTO: 5.9 10^3/UL (ref 1.5–6.6)
NEUTROPHILS # SNV AUTO: 9.8 X10^3/UL (ref 4.8–10.8)
NEUTROPHILS NFR BLD AUTO: 60.5 %
PDW BLD AUTO: 9.9 FL (ref 7.9–10.8)
PLATELET # BLD: 267 10^3/UL (ref 130–450)
PROT SPEC-MCNC: 6.6 G/DL (ref 6.7–8.2)
RBC MAR: 4.3 10^6/UL (ref 4.2–5.4)
SODIUM SERPLBLD-SCNC: 140 MMOL/L (ref 135–145)

## 2020-02-23 PROCEDURE — 83690 ASSAY OF LIPASE: CPT

## 2020-02-23 PROCEDURE — 82565 ASSAY OF CREATININE: CPT

## 2020-02-23 PROCEDURE — 99284 EMERGENCY DEPT VISIT MOD MDM: CPT

## 2020-02-23 PROCEDURE — 97597 DBRDMT OPN WND 1ST 20 CM/<: CPT

## 2020-02-23 PROCEDURE — 96366 THER/PROPH/DIAG IV INF ADDON: CPT

## 2020-02-23 PROCEDURE — 80202 ASSAY OF VANCOMYCIN: CPT

## 2020-02-23 PROCEDURE — 87077 CULTURE AEROBIC IDENTIFY: CPT

## 2020-02-23 PROCEDURE — 96375 TX/PRO/DX INJ NEW DRUG ADDON: CPT

## 2020-02-23 PROCEDURE — 36415 COLL VENOUS BLD VENIPUNCTURE: CPT

## 2020-02-23 PROCEDURE — 87075 CULTR BACTERIA EXCEPT BLOOD: CPT

## 2020-02-23 PROCEDURE — 10180 I&D COMPLEX PO WOUND INFCTJ: CPT

## 2020-02-23 PROCEDURE — 87070 CULTURE OTHR SPECIMN AEROBIC: CPT

## 2020-02-23 PROCEDURE — 99285 EMERGENCY DEPT VISIT HI MDM: CPT

## 2020-02-23 PROCEDURE — 85025 COMPLETE CBC W/AUTO DIFF WBC: CPT

## 2020-02-23 PROCEDURE — 87181 SC STD AGAR DILUTION PER AGT: CPT

## 2020-02-23 PROCEDURE — 87205 SMEAR GRAM STAIN: CPT

## 2020-02-23 PROCEDURE — 96365 THER/PROPH/DIAG IV INF INIT: CPT

## 2020-02-23 PROCEDURE — 80053 COMPREHEN METABOLIC PANEL: CPT

## 2020-02-23 RX ADMIN — ACETAMINOPHEN SCH MG: 325 TABLET ORAL at 22:07

## 2020-02-23 NOTE — HISTORY & PHYSICAL EXAMINATION
DATE OF SERVICE: 2020

Physician: Julio Odell MD

 

IDENTIFICATION:  A 39-year-old G3, P2, AB1 female who delivered on 2020 via repeat  sec
tion.

 

CHIEF COMPLAINT:  Bad odor from her wound.

 

HISTORY OF PRESENT ILLNESS:  The patient had a repeat low transverse  section on 2020 w
ithout incident.  She states she did well postoperatively, went home second day postop, did not have 
any difficulty; however, she was noted to have an opening of the wound on 2020 and was seen by 
Dr. Centeno on 2020, at which time, a wound VAC was placed.  She states she did well; however,
 yesterday she started to note a foul odor and this has become progressively worse with time.  She st
ates it has become quite bad in nature.  She denies temperature, chills, fevers.  She denies any mccoy
ge in her appetite, bowel function or other problems.  She had a history of gestational diabetes; how
ever, her blood sugar in the ED is normal.

 

PAST MEDICAL HISTORY:  Denies any hypertensive, diabetic, cardiac or pulmonary disease.

 

PAST SURGICAL HISTORY:  Positive for  section x2, tonsillectomy and adenoidectomy, as well as
 wisdom teeth.

 

ALLERGIES:  CODEINE, WHICH CAUSES ASTHMA.

 

CURRENT MEDICATIONS:  Prenatal vitamins as well as 600 mg of Motrin.

 

HABITS:  The patient denies the use of alcohol, tobacco, street or addictive drugs.

 

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

 

PHYSICAL EXAMINATION:  

VITAL SIGNS:  Pulse is 76, blood pressure 124/72, temperature is 36.9 and saturating 99%.

HEENT:  Pupils are equal and round.  Extraocular muscles are intact.

CARDIOVASCULAR:  Regular rate and rhythm without murmurs.

LUNGS:  Lung fields are clear without rales or wheezes.

ABDOMEN:  Very corpulent in nature.  There is a wound VAC, which is currently in place.  There is a v
patrick foul smelling, sweetish odor emanating from her wound at this time.  Upon removing the wound VAC,
 which was saturated with blood, there was an area on the left side of the incision, which was 2.5-3 
cm in length, which was opening.  There was minimal discharge noted at this particular time.  Culture
 was taken at this point. She shows no CVA tenderness.

 

LABORATORY DATA:  Labs showed a white count of 9.8, hemoglobin was 12.7, hematocrit was 38.6.  Her pl
atelets are 270/67.  Electrolytes are within normal limits.  Her blood sugar is 86.

 

IMPRESSION:  A 37 weeks, G3, P2 female, 11 days status post  section with a wound infection. 
 The concern about possible anaerobes has been entertained.  In discussing with the General Surgeon, 
it was decided to go ahead and open and pack at this time. She received vancomycin 1 gram and Rocephi
n 2.5 grams.

 

PLAN:  We will admit to Observation, at which time we will be changing the packing twice daily and we
 will also contact Wound Care for further guidance and possible home discharge for wound care.

 

 

DD: 2020 21:09

TD: 2020 21:11

Job #: [id number]

## 2020-02-23 NOTE — ED PHYSICIAN DOCUMENTATION
PD HPI SKIN





- Stated complaint


Stated Complaint: OPEN WOUND





- Chief complaint


Chief Complaint: Wound





- History obtained from


History obtained from: Patient, Family ()





- History of Present Illness


Timing - onset: How many days ago (She is 11 days postpartum having delivered by

 on .  She was doing well initially postpartum and a week 

ago started having some dehiscence on the corner of her wound.  She was seen in 

the emergency department and had some wound care but did not look infected at 

the time.  She was seen in follow-up in the office 4 days ago by Dr. Centeno 

and had a wound VAC placed.  Still no apparent infection at the time.  Since 

that time the patient states starting yesterday she started with an odor and 

some drainage into the tubing of the wound VAC.  It looked somewhat purulent as 

well as dark blood.  She states the lower abdomen is much more odorous today.  

She talked with Dr. Thomas who is on-call and referred to the ER.  She denies 

fever or chills.)


Timing - duration: Days


Timing - details: Gradual onset (Initially with some dehiscence and now some 

drainage and odor from the wound.  No systemic symptoms)


Location: Abdomen (lower abd a C-sec site)


Quality / character: Painful, Draining.  No: Discolored


Recently seen: Clinic (Seen in the office 4 days ago with a wound VAC placed on 

a dehiscence area without any apparent infection at the time), Surgery (She had 

a  on 2020 without complications.)





Review of Systems


Constitutional: reports: Myalgias.  denies: Fever, Chills


Nose: denies: Rhinorrhea / runny nose, Congestion


Throat: denies: Sore throat


Cardiac: denies: Chest pain / pressure, Palpitations


Respiratory: denies: Dyspnea, Cough


GI: reports: Abdominal Pain (at incision area, with increase the past couple of 

days), Nausea.  denies: Vomiting, Diarrhea


: denies: Discharge, Vaginal bleeding


Neurologic: reports: Generalized weakness.  denies: Altered mental status





PD PAST MEDICAL HISTORY





- Past Medical History


Cardiovascular: None


Respiratory: None


Neuro: None


Endocrine/Autoimmune: None


GI: GERD


GYN: None


: None


HEENT: None


Psych: None


Musculoskeletal: None


Derm: None





- Past Surgical History


Past Surgical History: Yes


/GYN:  section


HEENT: Tonsil/Adenoidectomy





- Present Medications


Home Medications: 


                                Ambulatory Orders











 Medication  Instructions  Recorded  Confirmed


 


Amox/Clav 875/125 [Augmentin] 1 each PO Q12H #14 tablet 18 


 


Hydrocodone/Acetaminophen 1 - 2 each PO Q6H PRN #14 tablet 18 





[Hydrocodon-Acetaminophen 5-325]   














- Allergies


Allergies/Adverse Reactions: 


                                    Allergies











Allergy/AdvReac Type Severity Reaction Status Date / Time


 


codeine Allergy  Hives Verified 20 15:54














- Social History


Does the pt smoke?: No


Smoking Status: Never smoker


Does the pt drink ETOH?: Yes


Does the pt have substance abuse?: No





- Immunizations


Immunizations are current?: Yes





- POLST


Patient has POLST: No





PD ED PE NORMAL





- Vitals


Vital signs reviewed: Yes





- General


General: Alert and oriented X 3, Well developed/nourished





- HEENT


HEENT: Pharynx benign





- Neck


Neck: Supple, no meningeal sign, No adenopathy





- Cardiac


Cardiac: RRR (regular but tachycardic), No murmur





- Respiratory


Respiratory: Clear bilaterally





- Abdomen


Abdomen: Soft, Non distended, No organomegaly, Other (There is a wound VAC in 

place horizontally across the  site.  There is a significant odor 

emanating from the area.  There is no obvious redness nor fluid collections 

under the skin in the area.  I left the wound VAC in place.  Culture was 

obtained from the wound VAC tubing of some of the material in it.)





- Female 


Female : Deferred





- Rectal


Rectal: Deferred





- Back


Back: No CVA TTP





- Derm


Derm: Normal color, Warm and dry





- Neuro


Neuro: Alert and oriented X 3, No motor deficit, Normal speech





Results





- Vitals


Vitals: 


                               Vital Signs - 24 hr











  20





  15:54 15:58 18:28


 


Temperature 36.9 C 36.9 C 


 


Heart Rate 110 H 110 H 76


 


Respiratory 15 15 18





Rate   


 


Blood Pressure 147/90 H 147/90 H 124/72


 


O2 Saturation 97 97 99














  20





  20:00


 


Temperature 


 


Heart Rate 77


 


Respiratory 16





Rate 


 


Blood Pressure 122/74


 


O2 Saturation 100








                                     Oxygen











O2 Source                      Room air

















- Labs


Labs: 


                                  Microbiology











 20 17:00 Wound Culture - Preliminary





 Abdomen 








                                Laboratory Tests











  20





  17:40 17:40


 


WBC  9.8 


 


RBC  4.30 


 


Hgb  12.7 


 


Hct  38.7 


 


MCV  90.0 


 


MCH  29.5 


 


MCHC  32.8 


 


RDW  13.9 


 


Plt Count  267 


 


MPV  9.9 


 


Neut # (Auto)  5.9 


 


Lymph # (Auto)  3.1 


 


Mono # (Auto)  0.6 


 


Eos # (Auto)  0.1 


 


Baso # (Auto)  0.1 


 


Absolute Nucleated RBC  0.00 


 


Nucleated RBC %  0.0 


 


Sodium   140


 


Potassium   3.9


 


Chloride   106


 


Carbon Dioxide   24


 


Anion Gap   10.0


 


BUN   16


 


Creatinine   0.5


 


Estimated GFR (MDRD)   139


 


Glucose   86


 


Calcium   9.2


 


Total Bilirubin   0.6


 


AST   21


 


ALT   25


 


Alkaline Phosphatase   80


 


Total Protein   6.6 L


 


Albumin   3.5


 


Globulin   3.1


 


Albumin/Globulin Ratio   1.1


 


Lipase   27














PD MEDICAL DECISION MAKING





- ED course


Complexity details: considered differential (The dehiscence and now some wound 

drainage and odor are suggestive of wound infection.  We will presume staff for 

possible gram-negative based on the odor.  She does not appear septic.  I did 

talk with Dr. Odell who is on-call and he came to the ER to evaluate the patient.

 His intention is to have her in the hospital for some IV antibiotics and wound 

care.  IV fluids and antibiotics are started here.), d/w patient





Departure





- Departure


Disposition: ED Place in Observation


Clinical Impression: 


 Wound infection after surgery





Condition: Stable


Record reviewed to determine appropriate education?: Yes


Discharge Date/Time: 20 21:08

## 2020-02-24 LAB — CREAT SERPLBLD-SCNC: 0.6 MG/DL (ref 0.4–1)

## 2020-02-24 RX ADMIN — SODIUM CHLORIDE, PRESERVATIVE FREE SCH: 5 INJECTION INTRAVENOUS at 13:50

## 2020-02-24 RX ADMIN — SODIUM CHLORIDE, PRESERVATIVE FREE SCH ML: 5 INJECTION INTRAVENOUS at 23:36

## 2020-02-24 RX ADMIN — ACETAMINOPHEN SCH MG: 325 TABLET ORAL at 02:26

## 2020-02-24 RX ADMIN — SODIUM CHLORIDE SCH MLS/HR: 9 INJECTION, SOLUTION INTRAVENOUS at 19:47

## 2020-02-24 RX ADMIN — ACETAMINOPHEN SCH MG: 325 TABLET ORAL at 09:43

## 2020-02-24 RX ADMIN — ACETAMINOPHEN SCH MG: 325 TABLET ORAL at 21:25

## 2020-02-24 RX ADMIN — Medication SCH TAB: at 15:02

## 2020-02-24 RX ADMIN — ACETAMINOPHEN SCH MG: 325 TABLET ORAL at 15:01

## 2020-02-24 RX ADMIN — DEXTROSE MONOHYDRATE SCH MLS/HR: 50 INJECTION, SOLUTION INTRAVENOUS at 11:12

## 2020-02-24 RX ADMIN — SODIUM CHLORIDE, PRESERVATIVE FREE SCH: 5 INJECTION INTRAVENOUS at 06:01

## 2020-02-24 RX ADMIN — SODIUM CHLORIDE, PRESERVATIVE FREE SCH ML: 5 INJECTION INTRAVENOUS at 16:11

## 2020-02-24 NOTE — PROVIDER PROGRESS NOTE
Subjective





- General


Admit Date: 02/23/20


Procedure Date: 02/12/20


Post Op Days: 12


Procedure Performed: opening wound





- Review of Systems


Wound/Incisions: positive: Erythema improving


Drain Type: Packed with wet to dry


General: positive: No symptoms (Pt is depressed secondary to hospitilization)


Pulmonary: positive: No symptoms


Cardiovascular: positive: No symptoms


Gastrointestinal: positive: No symptoms





Objective





- Patient Data


Vital Signs: 





                                Vital Signs x48h











  Temp Pulse Resp BP Pulse Ox


 


 02/24/20 00:23  36.8 C  81  20  130/70  100











Weight: 





                                     Weight











 02/22/20 02/23/20 02/24/20





 23:59 23:59 23:59


 


Weight (kg)  121 kg 











Intake & Output: 





                          Intake and Output Totals x24h











 02/22/20 02/23/20 02/24/20





 23:59 23:59 23:59


 


Intake Total  1500 200


 


Balance  1500 200














- Lab Results


Lab Results: 


                                 02/23/20 17:40





                                 02/23/20 17:40


Other Lab Results: 





                               Lab Results x24hrs











  02/23/20 02/23/20 Range/Units





  17:40 17:40 


 


WBC   9.8  (4.8-10.8)  x10^3/uL


 


RBC   4.30  (4.20-5.40)  10^6/uL


 


Hgb   12.7  (12.0-16.0)  g/dL


 


Hct   38.7  (37.0-47.0)  %


 


MCV   90.0  (81.0-99.0)  fL


 


MCH   29.5  (27.0-31.0)  pg


 


MCHC   32.8  (32.0-36.0)  g/dL


 


RDW   13.9  (12.0-15.0)  %


 


Plt Count   267  (130-450)  10^3/uL


 


MPV   9.9  (7.9-10.8)  fL


 


Neut # (Auto)   5.9  (1.5-6.6)  10^3/uL


 


Lymph # (Auto)   3.1  (1.5-3.5)  10^3/uL


 


Mono # (Auto)   0.6  (0.0-1.0)  10^3/uL


 


Eos # (Auto)   0.1  (0.0-0.7)  10^3/uL


 


Baso # (Auto)   0.1  (0.0-0.1)  10^3/uL


 


Absolute Nucleated RBC   0.00  x10^3/uL


 


Nucleated RBC %   0.0  /100WBC


 


Sodium  140   (135-145)  mmol/L


 


Potassium  3.9   (3.5-5.0)  mmol/L


 


Chloride  106   (101-111)  mmol/L


 


Carbon Dioxide  24   (21-32)  mmol/L


 


Anion Gap  10.0   (6-13)  


 


BUN  16   (6-20)  mg/dL


 


Creatinine  0.5   (0.4-1.0)  mg/dL


 


Estimated GFR (MDRD)  139   (>89)  


 


Glucose  86   ()  mg/dL


 


Calcium  9.2   (8.5-10.3)  mg/dL


 


Total Bilirubin  0.6   (0.2-1.0)  mg/dL


 


AST  21   (10-42)  IU/L


 


ALT  25   (10-60)  IU/L


 


Alkaline Phosphatase  80   ()  IU/L


 


Total Protein  6.6 L   (6.7-8.2)  g/dL


 


Albumin  3.5   (3.2-5.5)  g/dL


 


Globulin  3.1   (2.1-4.2)  g/dL


 


Albumin/Globulin Ratio  1.1   (1.0-2.2)  


 


Lipase  27   (22-51)  U/L














- Current Medications


Current Medications: 





                               Current Medications











Generic Name Dose Route Start Last Admin





  Trade Name Freq  PRN Reason Stop Dose Admin


 


Acetaminophen  650 mg  02/23/20 21:00  02/24/20 02:26





  Tylenol  PO   650 mg





  Q6H ROMINA   Administration





     





     





     





     


 


Sodium Chloride  10 ml  02/24/20 01:00  02/24/20 06:01





  Normal Saline Flush 0.9%  IVP   Not Given





  0100,0900,1700 [facility]   





     





     





     





     














ABX Reporting


Has patient been on IV antibiotics over the past 48 hours?: Yes





Impression/Plan





- Problem List


Problem List: 


OPD # 12 S/P RLTC/S





wound infection.  cultures pending.





Smell resolved





Rocephin/Vanco





Plan wet to dry





wound consult

## 2020-02-24 NOTE — CONSULTATION NOTE
Consultation Report: 





Call for assist in IV start after multiple failed attempts. US used to place 

20ga @LAC after attempt x2 (1 @R).  Easily aspirates and flushes. Secured.  Pt 

tolerated procedure without complication/complaint.

## 2020-02-24 NOTE — PHARMACY PROGRESS NOTE
- Best Possible Medication History


Admit Date and Time: 02/23/20 2006


Processed by: Pharmacy


Medication History completed: Yes


Patient Interview: Completed


Secondary Source(s): Insurance records





As the person ultimately responsible for medication therapy, providers are able 

to order a medication from an existing home medication list in [facility] via the 

"Reconcile Routine" prior to Confirmation of that medication by support staff. 

Such practice is discouraged except when the physician, in their clinical 

judgment, deems that a medical need exists for a medication without regard to 

previous use.

## 2020-02-24 NOTE — PROCEDURE REPORT
DATE OF SERVICE: 2020

Physician: Julio Odell MD

 

PREOPERATIVE DIAGNOSIS:   section wound abscess.

 

POSTOPERATIVE DIAGNOSES:   section wound abscess.

 

PROCEDURE PERFORMED:  Open drainage of  section wound.

 

SURGEON:  Julio Odell MD

 

ANESTHESIA:  Local with 2% lidocaine with epinephrine.

 

FINDINGS:  Upon entering the room, there was a very strong sweet smelling odor from her wound.  There
 was no evidence of any spreading at the skin surface at this time.

 

PROCEDURE:  Following prepping the wound with Betadine, a 22-gauge needle was used to inject 20 mL of
 2% lidocaine with epinephrine superior to the wound.  Once this was obtained, the wound was opened u
tilizing scissors down through the subcutaneous tissue to the second layer closure.  There was a mode
rate amount of pain accompanying this; however, patient tolerated.  Upon opening the wound, the area 
was irrigated and then packed with Betadine-soaked gauze.  A dressing was then placed and taped in pl
ace.  The patient tolerated the procedure well.  Cultures prior to Betadine were those of anaerobes a
nd aerobes.

 

 

DD: 2020 21:11

TD: 2020 21:12

Job #: [id number]

## 2020-02-24 NOTE — PHARMACY PROGRESS NOTE
- Therapy Status


Vancomycin regimen day #: 2


Therapy status: Awaiting steady state


Basis for treatment: Empirical


Treatment indication: 





wound infection


Trough goal: 15-20


Concurrent antibiotics: 





ceftriaxone





- CODEY Risk


Acute Kidney Injury risk factors: Wt >100kg or BMI >40, Goal trough >15, Total 

daily Vancomycin >4 grams





- Monitoring and Recommendation


Clinical response to treatment: 


I&O Previous 24 hours











 02/22/20 02/23/20 02/24/20





 23:59 23:59 23:59


 


Intake Total  1500 200


 


Balance  1500 200








Lab Results











  02/23/20





  17:40


 


BUN  16


 


Creatinine  0.5


 


Estimated GFR (MDRD)  139








                                                                        Cultures





02/23/20 19:30   Abdomen   Wound Culture - Preliminary


02/23/20 17:00   Abdomen   Wound Culture - Preliminary








Monitoring plan: Daily serum creatinine


Next trough due prior to maintenance dose #: 4


Next trough due (date/time): 2/25 at 0330


Areas for additional monitoring: IV to PO when appropriate, Therapy de-

escalation based on culture results, Acute Kidney Injury


Pharmacy recommendation: Continue current regime (1.5 g IV q8 h (frequency 

changed from q12h to q8h  2/2 to renal per protocol))

## 2020-02-25 VITALS — DIASTOLIC BLOOD PRESSURE: 77 MMHG | SYSTOLIC BLOOD PRESSURE: 141 MMHG

## 2020-02-25 LAB — VANCOMYCIN TROUGH SERPL-MCNC: 14.3 UG/ML (ref 10–20)

## 2020-02-25 RX ADMIN — SODIUM CHLORIDE SCH: 9 INJECTION, SOLUTION INTRAVENOUS at 11:38

## 2020-02-25 RX ADMIN — Medication SCH TAB: at 09:20

## 2020-02-25 RX ADMIN — DEXTROSE MONOHYDRATE SCH MLS/HR: 50 INJECTION, SOLUTION INTRAVENOUS at 09:20

## 2020-02-25 RX ADMIN — ACETAMINOPHEN SCH MG: 325 TABLET ORAL at 09:20

## 2020-02-25 RX ADMIN — SODIUM CHLORIDE SCH: 9 INJECTION, SOLUTION INTRAVENOUS at 12:37

## 2020-02-25 RX ADMIN — ACETAMINOPHEN SCH MG: 325 TABLET ORAL at 03:33

## 2020-02-25 RX ADMIN — SODIUM CHLORIDE, PRESERVATIVE FREE SCH ML: 5 INJECTION INTRAVENOUS at 09:21

## 2020-02-25 NOTE — PROVIDER PROGRESS NOTE
Subjective





- Prog Note Date


Prog Note Date: 02/25/20


Prog Note Time: 06:00





- Subjective


Subjective: 





Received call over night that patient was having red discoloration while having 

vancomycin


No SOB or wheezing, no facial swelling





Vancomycin held 


Benadryl given





FU in am





Objective





- Vital Signs/Intake & Output


Vital Signs: 


                                Vital Signs x48h











  Temp Pulse Resp BP Pulse Ox


 


 02/25/20 07:45  97.9 F  75  16  129/77  100


 


 02/25/20 05:06  97.9 F    


 


 02/25/20 03:36   81  20  137/74 H  100











Intake & Output: 


                                 Intake & Output











 02/22/20 02/23/20 02/24/20 02/25/20





 23:59 23:59 23:59 23:59


 


Intake Total  1500 2120 


 


Balance  1500 2120 














- Lab Results


Fish Bones: 


                                 02/23/20 17:40





                                 02/24/20 17:07


Other Labs: 


                               Lab Results x24hrs











  02/25/20 02/24/20 Range/Units





  03:34 17:07 


 


Creatinine   0.6  (0.4-1.0)  mg/dL


 


Estimated GFR (MDRD)   112  (>89)  


 


Last Dose Date  2/24/20   


 


Last Dose Time  2000   


 


Vancomycin Trough  14.3   (10.0-20.0)  ug/mL

## 2020-02-25 NOTE — PROVIDER PROGRESS NOTE
Subjective





- Prog Note Date


Prog Note Date: 02/25/20


Prog Note Time: 12:56





- Subjective


Pt reports feeling: Improved (Pain minimal.  reviewed with wound care)





Objective





- Vital Signs/Intake & Output


Reviewed Vital Signs: Yes


Vital Signs: 


                                Vital Signs x48h











  Temp Pulse Resp BP Pulse Ox


 


 02/25/20 07:45  36.6 C  75  16  129/77  100


 


 02/25/20 05:06  36.6 C    











Intake & Output: 


                                 Intake & Output











 02/22/20 02/23/20 02/24/20 02/25/20





 23:59 23:59 23:59 23:59


 


Intake Total  1500 2120 820


 


Balance  1500 2120 820














- Objective


General Appearance: positive: No acute distress, Alert


Respiratory: positive: Chest non-tender, No respiratory distress, Breath sounds 

nml


Cardiovascular: positive: Regular rate & rhythm, No murmur, No gallop


Abdomen: positive: Non-tender, Nml bowel sounds, Other (wound: dressing inpla

ce.)


Neurologic/Psychiatric: positive: Oriented x3





- Lab Results


Fish Bones: 


                                 02/23/20 17:40





                                 02/24/20 17:07


Other Labs: 


                               Lab Results x24hrs











  02/25/20 02/24/20 Range/Units





  03:34 17:07 


 


Creatinine   0.6  (0.4-1.0)  mg/dL


 


Estimated GFR (MDRD)   112  (>89)  


 


Last Dose Date  2/24/20   


 


Last Dose Time  2000   


 


Vancomycin Trough  14.3   (10.0-20.0)  ug/mL














ABX Reporting


Has patient been on IV antibiotics over the past 48 hours?: Yes





Assessment/Plan





- Problem List


(1) Wound infection after surgery


Impression: 


antibiotic sensitivity reviewed 





Change to Bactrum bid 7 days





RTC one week.





wound care as out patient.

## 2022-02-12 ENCOUNTER — HOSPITAL ENCOUNTER (EMERGENCY)
Dept: HOSPITAL 76 - ED | Age: 42
Discharge: HOME | End: 2022-02-12
Payer: COMMERCIAL

## 2022-02-12 VITALS — SYSTOLIC BLOOD PRESSURE: 138 MMHG | DIASTOLIC BLOOD PRESSURE: 81 MMHG

## 2022-02-12 DIAGNOSIS — U07.1: Primary | ICD-10-CM

## 2022-02-12 DIAGNOSIS — Z87.891: ICD-10-CM

## 2022-02-12 DIAGNOSIS — H65.03: ICD-10-CM

## 2022-02-12 PROCEDURE — 99282 EMERGENCY DEPT VISIT SF MDM: CPT

## 2022-02-12 PROCEDURE — 99283 EMERGENCY DEPT VISIT LOW MDM: CPT

## 2022-02-12 NOTE — ED PHYSICIAN DOCUMENTATION
PD HPI PED ILLNESS





- Stated complaint


Stated Complaint: COUGH/CONGESTION





- Chief complaint


Chief Complaint: Resp





- History obtained from


History obtained from: Patient





- Additional information


Additional information: 





She has been sick for about 4 days with nasal congestion which was severe but is

now improving, cough productive of minimal yellow sputum and sore throat which 

is also improving.  No associated fevers or body aches.  She did lose the sense 

of taste yesterday.  She is vaccinated against COVID.  Both of her ears are 

having a lot of pressure but no pain per se.  Her daughter had a sore throat 

earlier this week which has since resolved.





Review of Systems


Constitutional: reports: Fatigue ("But I have 2 kids"), Sweats.  denies: Fever, 

Chills, Myalgias


Ears: denies: Loss of hearing, Drainage/discharge


Nose: reports: Rhinorrhea / runny nose, Congestion


Throat: reports: Sore throat


Cardiac: denies: Chest pain / pressure, Palpitations


Respiratory: reports: Cough.  denies: Dyspnea





PD PAST MEDICAL HISTORY





- Past Medical History


Cardiovascular: None


Respiratory: None


Neuro: None


Endocrine/Autoimmune: None


GI: GERD


GYN: None


: None


HEENT: None


Psych: None


Musculoskeletal: None


Derm: None





- Past Surgical History


Past Surgical History: Yes


/GYN:  section


HEENT: Tonsil/Adenoidectomy





- Present Medications


Home Medications: 


                                Ambulatory Orders











 Medication  Instructions  Recorded  Confirmed


 


Ibuprofen [Motrin] 600 mg PO DAILY PRN 20


 


Pnv No.95/Ferrous Fum/Folic AC 1 tab PO DAILY 20





[Prenatal Vitamins Tablet]   














- Allergies


Allergies/Adverse Reactions: 


                                    Allergies











Allergy/AdvReac Type Severity Reaction Status Date / Time


 


codeine Allergy  Hives Verified 22 12:48














- Social History


Does the pt smoke?: No


Smoking Status: Former smoker


Does the pt drink ETOH?: Yes


Does the pt have substance abuse?: No





- Immunizations


Immunizations are current?: Yes





- POLST


Patient has POLST: No





PD ED PE NORMAL





- Vitals


Vital signs reviewed: Yes





- General


General: Alert and oriented X 3, No acute distress





- HEENT


HEENT: Pharynx benign (Tonsils are surgically absent), Other (She has bilateral 

serous otitis without signs of infection)





- Neck


Neck: Supple, no meningeal sign, No bony TTP





- Cardiac


Cardiac: RRR, No murmur





- Respiratory


Respiratory: No respiratory distress, Clear bilaterally





- Abdomen


Abdomen: Non tender





- Derm


Derm: Normal color, Warm and dry, No rash





- Neuro


Neuro: Alert and oriented X 3, Normal speech





Results





- Vitals


Vitals: 


                               Vital Signs - 24 hr











  22





  12:43


 


Temperature 36.1 C L


 


Heart Rate 89


 


Respiratory 16





Rate 


 


Blood Pressure 138/81 H


 


O2 Saturation 99








                                     Oxygen











O2 Source                      Room air

















Departure





- Departure


Disposition: 01 Home, Self Care


Clinical Impression: 


 Viral URI, Acute serous otitis media of both ears





Condition: Good


Record reviewed to determine appropriate education?: Yes


Instructions:  ED Otitis Media Serous Adult, ED Viral Syndrome


Comments: 


As discussed, it sounds like you have a viral upper respiratory infection 

complicated by serous otitis media AKA "snot behind the eardrums" I would 

recommend taking an over-the-counter decongestant such as Mucinex D per package 

instructions and drinking plenty of fluids.





You have a Covid test pending.  You need to self quarantine until the result is 

done and negative.  Do not leave your house.  Do not get near anybody.  The 

results should be done in 48 to 72 hours.  We will call with a positive result, 

the fastest way to get a negative result for confirmation though is to go to the

hospital website at [email or web address], click on the my [facility] tab and

sign up for the patient portal.





If any friends or family get sick and would like to have a Covid test done, but 

do not have signs or symptoms that would necessitate being hospitalized, there 

are multiple local options for Covid testing. [facility] keeps 

an updated list of testing and vaccination options at: 

[email or web address].